# Patient Record
Sex: FEMALE | Race: WHITE
[De-identification: names, ages, dates, MRNs, and addresses within clinical notes are randomized per-mention and may not be internally consistent; named-entity substitution may affect disease eponyms.]

---

## 2017-04-20 NOTE — OR
DATE OF PROCEDURE:  04/18/2017

 

PREOPERATIVE DIAGNOSIS:  Laryngopharyngeal dysphagia.

 

POSTOPERATIVE DIAGNOSIS:  Laryngopharyngeal dysphagia with normal upper gastrointestinal

endoscopic exam, status post Solo-en-Y gastric bypass.

 

OPERATIVE PROCEDURE:  Upper GI endoscopy with biopsies of gastric pouch for CLOtest.

 

ANESTHESIA:  IV sedation.

 

INDICATIONS FOR PROCEDURE:  This is a 61-year-old, status post [place]-en-Y gastric bypass in

early 2000s, presenting with some increasing dysphagia.  This is referable to the

laryngopharyngeal area to rule out any anatomic abnormalities.  The patient is to undergo an

upper GI endoscopy.  Potential risks of the procedure including bleeding and perforation

were discussed, and the patient wishes to proceed.

 

DETAILS OF PROCEDURE:  The patient was taken to the operating room, placed in a left lateral

decubitus position.  IV sedation was administered, after which the upper GI endoscope was

passed orally through the length of the esophagus into the stomach pouch, from there through

the gastrojejunostomy and into the Solo limb roughly 20 cm.

 

The findings included normal hypopharynx, larynx, and upper esophageal sphincter.  No

specific abnormalities were noted in that area, and those can be redness or inflammation of

the esophageal body.  EG junction, gastric pouch, gastrojejunostomy, and Solo limb were all

otherwise entirely normal with no areas of stricturing or significant inflammation.  To

establish the patient's H. pylori status, CLOtest was obtained from the gastric pouch.

Minimal bleeding from the biopsy sites was seen.  The patient was taken to the recovery room

in satisfactory condition after removal of the scope.

 

The plan will be to obtain an x-ray swallow study with Speech Pathology consult and then

follow up with Payton Orozco thereafter.

 

 

 

 

Francisco Duran MD

DD:  04/19/2017 17:33:28

DT:  04/20/2017 13:29:27

Job #:  [id number]

## 2017-12-21 NOTE — OR
DATE OF PROCEDURE:  12/18/2017

 

PREOPERATIVE DIAGNOSIS:  Indications for screening colonoscopy.

 

POSTOPERATIVE DIAGNOSIS:  Colonoscopy showing small polyps x2.

 

OPERATIVE PROCEDURES:  Flexible colonoscopy with;

1. Biopsy of polyp located at splenic flexure (77721).

2. Excision of polyp by snare technique at 30 cm from the dentate line (70464).

 

ANESTHESIA:  IV sedation.

 

INDICATION FOR PROCEDURE:  A 62-year-old presenting with indications for screening

colonoscopy.  Plan is to proceed with a flexible colonoscopy with biopsies and/or

polypectomy as indicated.  Potential risks including bleeding and perforation were

discussed, and the patient wishes to proceed.

 

DETAILS OF PROCEDURE:  The patient was taken to the operating room and placed in a left

lateral decubitus position.  IV sedation was administered.  The initial digital rectal exam

was performed and was unremarkable.  The scope was then passed through the rectum with

retroflexion revealing uncomplicated hemorrhoidal columns.  The scope was then eventually

passed to the level of the cecum.  The prep was fairly good with there being only a small

amount of liquid stool present.  To that level, there were no diverticula and no areas of

colitis.  There were 2 small polyps, one located in the splenic flexure.  This was excised

by means of 2 bites with the biopsy forceps, which appeared to completely remove the polyp.

Hemostasis did not create any problem there.  The patient had a slightly larger polyp

located at 30 cm from the dentate line, which was excised by means of cautery snare

technique.  This was likewise sent for histologic evaluation.  Good hemostasis was noted at

this site as well.  Apart from that, there were no additional areas of polyps or signs of

neoplasia.  The scope was then withdrawn, and the procedure then concluded.

 

We will await the pathology reports.  These may both be hyperplastic polyps, in which case,

a followup colonoscopy should be in 10 years.  If they are adenomatous polyps, then follow

up colonoscopy should be in 2 years.

 

 

 

 

Francisco Duran MD

DD:  12/21/2017 06:18:44

DT:  12/21/2017 10:44:22

Job #:  [id number]

## 2019-09-02 ENCOUNTER — HOSPITAL ENCOUNTER (EMERGENCY)
Dept: HOSPITAL 11 - JP.ED | Age: 64
Discharge: HOME | End: 2019-09-02
Payer: MEDICAID

## 2019-09-02 VITALS — DIASTOLIC BLOOD PRESSURE: 55 MMHG | SYSTOLIC BLOOD PRESSURE: 112 MMHG

## 2019-09-02 DIAGNOSIS — Z88.2: ICD-10-CM

## 2019-09-02 DIAGNOSIS — E03.9: ICD-10-CM

## 2019-09-02 DIAGNOSIS — Z90.49: ICD-10-CM

## 2019-09-02 DIAGNOSIS — S83.422A: Primary | ICD-10-CM
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
## 2019-09-02 DIAGNOSIS — Z98.51: ICD-10-CM

## 2019-09-02 DIAGNOSIS — F32.9: ICD-10-CM

## 2019-09-02 DIAGNOSIS — Z88.8: ICD-10-CM

## 2019-09-02 DIAGNOSIS — Z79.899: ICD-10-CM

## 2019-09-02 NOTE — CRLCR
Indication:



Missed a step. Pain.



Technique:



Three views of the left knee were obtained.



Comparison:



None



Findings:



Moderate narrowing of the medial compartment is identified. Spurring of the 

tibial spines is identified. Narrowing of the patellofemoral articulation 

is identified. No acute fracture or subluxation is identified.



Impression:



Degenerative change.



Dictated by Kathleen Padilla MD @ Sep  2 2019  1:15PM



Signed by Dr. Kathleen Padilla @ Sep  2 2019  1:16PM

## 2019-09-02 NOTE — EDM.PDOC
ED HPI GENERAL MEDICAL PROBLEM





- General


Chief Complaint: Lower Extremity Injury/Pain


Stated Complaint: PAIN IN LEFT KNEE INJURED


Time Seen by Provider: 09/02/19 12:30


Source of Information: Reports: Patient, Family


History Limitations: Reports: No Limitations





- History of Present Illness


INITIAL COMMENTS - FREE TEXT/NARRATIVE: 





64-year-old female with left knee pain for the past 10 days. She stepped on 

some uneven ground and felt something happened to her knee. Since that time she'

s had some pain with weightbearing especially in the mornings. No swelling, no 

bruising or deformity. She is able to move the leg fairly easily without 

discomfort.


Onset: Sudden


Duration: Day(s): (9 days ago)


Location: Reports: Lower Extremity, Left


Associated Symptoms: Reports: No Other Symptoms





- Related Data


 Allergies











Allergy/AdvReac Type Severity Reaction Status Date / Time


 


Sulfa (Sulfonamide Allergy  Hives Verified 12/18/17 07:24





Antibiotics)     


 


iron [From Venofer] AdvReac  Hives Verified 12/18/17 07:24











Home Meds: 


 Home Meds





Levothyroxine [Synthroid] 100 mcg PO ACBREAKFAST 08/07/16 [History]


Sertraline [Zoloft] 100 mg PO DAILY 08/07/16 [History]


Biotin 5,000 mcg PO DAILY 04/14/17 [History]


Calcium Citrate/Vitamin D3 [Calcium Citrate + D] 1 tab PO BID 04/14/17 [History]


Cyanocobalamin (Vitamin B-12) [Vitamin B-12] 1,000 mcg SL DAILY 04/14/17 [

History]


Ergocalciferol (Vitamin D2) [Vitamin D] 1,000 unit PO DAILY 04/14/17 [History]


Multivitamin [Multiple Vitamins] 2 tab PO DAILY 04/14/17 [History]


Vitamin B Complex [B Complex] 2 tab PO DAILY 04/14/17 [History]


Vitamin E 1,000 unit PO DAILY 04/14/17 [History]


Zinc 100 mg PO DAILY 04/14/17 [History]


valACYclovir [Valtrex] 500 mg PO BID 04/14/17 [History]


busPIRone [Buspar] 10 tab PO BID 04/20/18 [History]











Past Medical History


HEENT History: Reports: Impaired Vision


Other HEENT History: Glasses, excessive tear production right eye


Gastrointestinal History: Reports: Chronic Diarrhea, GERD


OB/GYN History: Reports: Pregnancy


Musculoskeletal History: Reports: None


Psychiatric History: Reports: Depression


Endocrine/Metabolic History: Reports: Hypothyroidism


Hematologic History: Reports: Iron Deficiency


Oncologic (Cancer) History: Reports: Basal Cell Carcinoma





- Infectious Disease History


Infectious Disease History: Reports: Chicken Pox, Mumps, Shingles





- Past Surgical History


Head Surgeries/Procedures: Reports: None


HEENT Surgical History: Reports: None


GI Surgical History: Reports: Bariatric Procedure, Cholecystectomy, Colonoscopy


Female  Surgical History: Reports: Tubal Ligation


Endocrine Surgical History: Reports: None


Musculoskeletal Surgical History: Reports: Carpal Tunnel


Oncologic Surgical History: Reports: None


Dermatological Surgical History: Reports: None





Social & Family History





- Family History


Family Medical History: Noncontributory


Cardiac: Reports: MI, Stent


Other Cardiac Family History: Father


Musculoskeletal: Reports: Arthritis


Endocrine/Metabolic: Reports: Hypothyroidism


Hematologic: Reports: Anemia


Oncologic: Reports: Colon, Other (See Below)


Other Oncologic Family History: paternal & maternal grandfathers





- Caffeine Use


Caffeine Use: Reports: Coffee, Soda





Review of Systems





- Review of Systems


Review Of Systems: See Below


Constitutional: Denies: Fever


Respiratory: Reports: No Symptoms


Cardiovascular: Reports: No Symptoms


GI/Abdominal: Reports: No Symptoms


Skin: Denies: Bruising


Neurological: Reports: No Symptoms.  Denies: Paresthesia





ED EXAM, GENERAL





- Physical Exam


Exam: See Below


Exam Limited By: No Limitations


General Appearance: Alert, No Apparent Distress


Respiratory/Chest: No Respiratory Distress, Lungs Clear


Extremities: Other (Exam of the knees shows no asymmetry, the left knee has no 

effusion. She is tender over the lateral aspect of the knee and the proximal 

aspect of the fibula. No crepitus or deformity.)





Course





- Vital Signs


Last Recorded V/S: 


 Last Vital Signs











Temp  96.4 F   09/02/19 13:04


 


Pulse  71   09/02/19 13:04


 


Resp  16   09/02/19 13:04


 


BP  112/55 L  09/02/19 13:04


 


Pulse Ox  96   09/02/19 13:04














- Re-Assessments/Exams


Free Text/Narrative Re-Assessment/Exam: 





09/02/19 12:50


The left knee x-ray was obtained.


09/02/19 13:06


X-ray is normal, a three-inch Ace wrap was applied to the knee for support. 

Patient will increase activity as tolerated and recheck with orthopedics if not 

improving satisfactorily.





Departure





- Departure


Time of Disposition: 13:24


Disposition: Home, Self-Care 01


Clinical Impression: 


Sprain of left knee


Qualifiers:


 Encounter type: initial encounter Involved ligament of knee: lateral 

collateral ligament Qualified Code(s): S83.422A - Sprain of lateral collateral 

ligament of left knee, initial encounter








- Discharge Information


Instructions:  Knee Sprain, Adult


Referrals: 


Thompson Medellin MD [Primary Care Provider] - 


Forms:  ED Department Discharge


Care Plan Goals: 


Wrap knee for support, increase activity as tolerated and recheck at the clinic 

with orthopedics if not improving satisfactorily over the next several days.